# Patient Record
(demographics unavailable — no encounter records)

---

## 2024-11-01 NOTE — END OF VISIT
[] : Fellow [FreeTextEntry3] : Patient seen with Dr Dawn. Stable RA seropositive non-erosive on MTX 8 tabs. Pain in right knee following a fall a month ago. RA is inactive. Right knee mild warmth and swelling in area of fall. For plain Xray, reassurance. Can do further imaging if it doesnt resolve. Routine bloodwork [Time Spent: ___ minutes] : I have spent [unfilled] minutes of time on the encounter which excludes teaching and separately reported services.

## 2024-11-01 NOTE — PHYSICAL EXAM
[General Appearance - Alert] : alert [General Appearance - In No Acute Distress] : in no acute distress [General Appearance - Well Nourished] : well nourished [General Appearance - Well Developed] : well developed [PERRL With Normal Accommodation] : pupils were equal in size, round, and reactive to light [Sclera] : the sclera and conjunctiva were normal [Examination Of The Oral Cavity] : the lips and gums were normal [Oropharynx] : the oropharynx was normal [Respiration, Rhythm And Depth] : normal respiratory rhythm and effort [Auscultation Breath Sounds / Voice Sounds] : lungs were clear to auscultation bilaterally [Heart Rate And Rhythm] : heart rate was normal and rhythm regular [Heart Sounds] : normal S1 and S2 [Abdomen Soft] : soft [Edema] : there was no peripheral edema [Abdomen Tenderness] : non-tender [Cervical Lymph Nodes Enlarged Posterior Bilaterally] : posterior cervical [Cervical Lymph Nodes Enlarged Anterior Bilaterally] : anterior cervical [Supraclavicular Lymph Nodes Enlarged Bilaterally] : supraclavicular [Skin Lesions] : no skin lesions [] : no rash [Abnormal Walk] : normal gait [Nail Clubbing] : no clubbing  or cyanosis of the fingernails [Musculoskeletal - Swelling] : no joint swelling seen [FreeTextEntry1] : R knee medial aspect trace effusion and warmth, tenderness, ROM intact. otherwise no tender or swollen joints

## 2024-11-01 NOTE — ASSESSMENT
[FreeTextEntry1] : 42F w/ seropositive, nonerosive RA (dx 5/2017 + +CCP>250) on MTX, hypothyroidism, coming to clinic for f/u.  1) Seropositive nonerosive RA: CDAI remains 0 on MTX this visit -X-rays wrists, hands 2/2023 without erosions -x-ray c spine 1/2021, no atlantoaxial instability, mild C5-6 narrowing [] c/w MTX 8 tablets weekly (decreased from MTX 9 tabs 5/2024) -C/w Folic acid 1 mg daily [] Check labs to monitor disease activity and including inflammatory markers - CBC, CMP, ESR, CRP -Quantiferon and hepatitis negative 5/2024   2) R knee pain s/p fall [] Will obtain x-rays R knee r/o fracture (though low suspicion) given intact ROM [] PT referral given [] Advised can do voltaren gel and NSAIDs PRN  - Will re-evaluate in 4-6 weeks to see if needs additional imaging   3) +LISSETH in 2017, subserologies negative most recently check in 2022. No new symptoms at this time. -continue to monitor  4) Overweight -lifestyle modifications discussed at this visit -had been exercising regularly 30-45 minutes daily 5 days a week, currently on hold due to acute knee pain   4) Vitamin D deficiency -vitamin D 30 8/24, c/w supplementation   5) HCM -Flu shot -refused -Prevnar 6/2017, Pneumovax 1/2018. PCV 20 vaccination 9/8/2023 -Has copper IUD for contraception -negative quantiferon and Hep B panel 5/2024  -s/p Shingrix 1 dose in 2/2023 and 2nd dose 6/2023 -s/p COVID vaccine series and 3 boosters -need GYN f/u for cervical CA screening, last done in 2021 and negative per patient reporting -needs to follow up for mammogram [] Will check A1c and lipid panel today   RTC 4-6 weeks to re-assess R knee pain   case d/w Dr. Geoff Dawn MD Rheumatology Fellow

## 2024-11-01 NOTE — HISTORY OF PRESENT ILLNESS
[___ Month(s) Ago] : [unfilled] month(s) ago [FreeTextEntry1] : Patient reports mechanical trip and fall onto R knee a month ago. Reports pain since then. Took Tylenol initially but prefers to avoid meds. Has been able to ambulate and do her ADLs. Otherwise denies any joint swelling, erythema, redness, or AM stiffness. Adherent with MTX 8 pills/week and folic acid daily. Has IUD Denies fevers, chills, night sweats, alopecia, weight loss, eye pain, eye redness, vision changes, photosensitivity, rashes, ulcers, cough, chest pain, SOB, abdominal pain, n/v/d/c, changes in urinary freq, dysuria, hematuria, foamy urine

## 2024-12-13 NOTE — PHYSICAL EXAM
[General Appearance - Alert] : alert [General Appearance - In No Acute Distress] : in no acute distress [General Appearance - Well Nourished] : well nourished [General Appearance - Well Developed] : well developed [Sclera] : the sclera and conjunctiva were normal [PERRL With Normal Accommodation] : pupils were equal in size, round, and reactive to light [Examination Of The Oral Cavity] : the lips and gums were normal [Oropharynx] : the oropharynx was normal [Respiration, Rhythm And Depth] : normal respiratory rhythm and effort [Auscultation Breath Sounds / Voice Sounds] : lungs were clear to auscultation bilaterally [Heart Rate And Rhythm] : heart rate was normal and rhythm regular [Heart Sounds] : normal S1 and S2 [Edema] : there was no peripheral edema [Abdomen Soft] : soft [Abdomen Tenderness] : non-tender [Cervical Lymph Nodes Enlarged Posterior Bilaterally] : posterior cervical [Cervical Lymph Nodes Enlarged Anterior Bilaterally] : anterior cervical [Supraclavicular Lymph Nodes Enlarged Bilaterally] : supraclavicular [] : no rash [Skin Lesions] : no skin lesions [Abnormal Walk] : normal gait [Nail Clubbing] : no clubbing  or cyanosis of the fingernails [Musculoskeletal - Swelling] : no joint swelling seen [FreeTextEntry1] : no synovitis, R knee no tenderness, full ROM

## 2024-12-13 NOTE — HISTORY OF PRESENT ILLNESS
[___ Month(s) Ago] : [unfilled] month(s) ago [FreeTextEntry1] : Following up for R knee pain s/p mechanical trip and fall 2 months ago. X-rays negative. Has been able to ambulate and do her ADLs. Did PT with improved symptoms. Has been applying voltaren gel with relief. Overall 70% improved symptoms. Otherwise denies any joint swelling, erythema, redness, or AM stiffness. RA controlled. Adherent with MTX 8 pills/week and folic acid daily. Has IUD Denies fevers, chills, night sweats, alopecia, weight loss, eye pain, eye redness, vision changes, photosensitivity, rashes, ulcers, cough, chest pain, SOB, abdominal pain, n/v/d/c, changes in urinary freq, dysuria, hematuria, foamy urine

## 2024-12-13 NOTE — ASSESSMENT
[FreeTextEntry1] : 42F w/ seropositive, nonerosive RA (dx 5/2017 + +CCP>250) on MTX, hypothyroidism, coming to clinic for f/u.  1) Seropositive nonerosive RA: CDAI remains 0 on MTX this visit -X-rays wrists, hands 2/2023 without erosions; will repeat at next visit 2/2025  -x-ray c spine 1/2021, no atlantoaxial instability, mild C5-6 narrowing [] c/w MTX 8 tablets weekly (decreased from MTX 9 tabs 5/2024) -C/w Folic acid 1 mg daily [] 11/2024 labs for disease activity and monitoring stable; will repeat again in 2 months  -Quantiferon and hepatitis negative 5/2024   2) R knee pain s/p fall 2 months ago, now much improved  - X-rays negative  -S/p PT with improvement, now doing exercises at home  -Prefers to avoids oral meds; using voltaren gel with relief; can c/w voltaren gel   3) +LISSETH in 2017, subserologies negative most recently check in 2022. No new symptoms at this time. -continue to monitor  4) Overweight -lifestyle modifications discussed at this visit -had been exercising regularly 30-45 minutes daily 5 days a week   5) Vitamin D deficiency -vitamin D 30 8/24, c/w supplementation   6) HCM -Flu shot -refused -Prevnar 6/2017, Pneumovax 1/2018. PCV 20 vaccination 9/8/2023 -Has copper IUD for contraception -negative quantiferon and Hep B panel 5/2024  -s/p Shingrix 1 dose in 2/2023 and 2nd dose 6/2023 -s/p COVID vaccine series and 3 boosters -need GYN f/u for cervical CA screening, last done in 2021 and negative per patient reporting -needs to follow up for mammogram -A1c 5.7 11/24 c/w pre-DM, elevated lipids but not done fasting, will repeat at next visit   RTO 2 months   Case d/w Dr. Faith Dawn MD Rheumatology Fellow

## 2025-02-07 NOTE — HISTORY OF PRESENT ILLNESS
[___ Month(s) Ago] : [unfilled] month(s) ago [FreeTextEntry1] : Reports feeling well. R knee pain after fall now 90% improved. Denies other joint pain, joint swelling, erythema, redness, or AM stiffness. RA controlled. Adherent with MTX 8 pills/week and folic acid daily. Has IUD Denies fevers, chills, night sweats, alopecia, weight loss, eye pain, eye redness, vision changes, photosensitivity, rashes, ulcers, cough, chest pain, SOB, abdominal pain, n/v/d/c, changes in urinary freq, dysuria, hematuria, foamy urine

## 2025-02-07 NOTE — END OF VISIT
[] : Fellow [FreeTextEntry3] : Patient seen with Dr Dawn. Stable RA for routine blood work, continue medications [Time Spent: ___ minutes] : I have spent [unfilled] minutes of time on the encounter which excludes teaching and separately reported services.

## 2025-02-07 NOTE — ASSESSMENT
[FreeTextEntry1] : 43F w/ seropositive, nonerosive RA (dx 5/2017 + +CCP>250) on MTX, hypothyroidism, coming to clinic for f/u.  1) Seropositive nonerosive RA: CDAI remains 0 on MTX this visit -x-ray c spine 1/2021, no atlantoaxial instability, mild C5-6 narrowing [] c/w MTX 8 tablets weekly (decreased from MTX 9 tabs 5/2024) -C/w Folic acid 1 mg daily [] will obtain disease monitoring labs (CBC, CMP, ESR, and CRP) today  [] X-rays wrists, hands 2/2023 without erosions; will repeat today  -Quantiferon and hepatitis negative 5/2024   2) R knee pain s/p fall 2 months ago, now much improved  -X-rays negative  -S/p PT with improvement, now doing exercises at home  -Prefers to avoids oral meds; using voltaren gel PRN with relief   3) +LISSETH in 2017, subserologies negative most recently check in 2022. No new symptoms at this time. -continue to monitor  4) Overweight -lifestyle modifications discussed at this visit -reports returning to exercising 30-45 minutes daily 3 days a week   5) HCM -Flu shot -refused -Prevnar 6/2017, Pneumovax 1/2018. PCV 20 vaccination 9/8/2023 -Has copper IUD for contraception -negative quantiferon and Hep B panel 5/2024  -s/p Shingrix 1 dose in 2/2023 and 2nd dose 6/2023 -s/p COVID vaccine series and 3 boosters -need GYN f/u for cervical CA screening, last done in 2021 and negative per patient reporting -needs to follow up for mammogram [] A1c 5.7 11/24 c/w pre-DM, elevated lipids but not done fasting, will repeat today   RTO 3 months   Case d/w Dr. Geoff Dawn MD Rheumatology Fellow

## 2025-02-07 NOTE — PHYSICAL EXAM
[General Appearance - Alert] : alert [General Appearance - In No Acute Distress] : in no acute distress [General Appearance - Well Nourished] : well nourished [General Appearance - Well Developed] : well developed [Sclera] : the sclera and conjunctiva were normal [PERRL With Normal Accommodation] : pupils were equal in size, round, and reactive to light [Examination Of The Oral Cavity] : the lips and gums were normal [Oropharynx] : the oropharynx was normal [Respiration, Rhythm And Depth] : normal respiratory rhythm and effort [Auscultation Breath Sounds / Voice Sounds] : lungs were clear to auscultation bilaterally [Heart Rate And Rhythm] : heart rate was normal and rhythm regular [Heart Sounds] : normal S1 and S2 [Edema] : there was no peripheral edema [Abdomen Soft] : soft [Abdomen Tenderness] : non-tender [Cervical Lymph Nodes Enlarged Posterior Bilaterally] : posterior cervical [Cervical Lymph Nodes Enlarged Anterior Bilaterally] : anterior cervical [Supraclavicular Lymph Nodes Enlarged Bilaterally] : supraclavicular [] : no rash [Skin Lesions] : no skin lesions [Abnormal Walk] : normal gait [Nail Clubbing] : no clubbing  or cyanosis of the fingernails [Musculoskeletal - Swelling] : no joint swelling seen [FreeTextEntry1] : no synovitis, ROM of joints intact

## 2025-04-09 NOTE — HEALTH RISK ASSESSMENT
[1 or 2 (0 pts)] : 1 or 2 (0 points) [Never (0 pts)] : Never (0 points) [No] : In the past 12 months have you used drugs other than those required for medical reasons? No [0] : 2) Feeling down, depressed, or hopeless: Not at all (0) [PHQ-2 Negative - No further assessment needed] : PHQ-2 Negative - No further assessment needed [Never] : Never [Audit-CScore] : 0 [[id number]] : 0

## 2025-04-09 NOTE — HISTORY OF PRESENT ILLNESS
[FreeTextEntry1] : weight gain 20 lbs [de-identified] : 43F hx seropositive nonerosive RA (on MTX/folate), hypothyroidism, dyslipidemia.  # Secondary amenorrhea - 12 months without menstruation. - Irregular periods preceding this period but regular before that - denies hot flashes but has weight gain - has copper IUD (unlikely to be pregnant) - not currently under mental or physical duress - has not been able to see Gyn - denies headaches or visual changes  # Rheumatoid Arthritis - symptoms stable on methotrexate/folate (current dose 20mg qWeekly), follows closely with rheum (last appt 2/2025) - negative quantiferon and Hep B panel 5/2024   # Hypothyroidism - asymptomatic on levothyroxine 125mcg qD - last TFTs: TSH 1.66 4/2023  # Overweight # HLD # Prediabetes - 20 lbs increase developed over past couple years - recently injured knee (6+ months ago; completed PT) and decreased exercise and diet worsened so thinks she put on weight due to that - prefers holding off on statin initiation at this time - LDL noted to be 148 - A1c 5.7 11/2024 - diet: has decreased proportions of carbohydrates and fats  # HCM - Has copper IUD for contraception - pap/hpv 4/2021 neg; rpt due 2026 - colon cancer screening FOBT neg 2021 - HCV not tested, HIV neg 2021 - Vax: shingles 2023 (per rheum recs), Covid x5 up to 2021, prevnar 20 2023, Tdap 4/2015 --> due for rpt now

## 2025-04-09 NOTE — ASSESSMENT
[FreeTextEntry1] : 43F hx seropositive nonerosive RA (on MTX/folate), hypothyroidism, dyslipidemia.  #HCM - RTC in 6-12 months for annual check up - Has copper IUD for contraception - mammo ordered - pap/hpv 4/2021 neg; rpt due 2026 - colon cancer screening FOBT neg 2021 - HCV not tested, HIV neg 2021 - Vax: shingles 2023 (per rheum recs), Covid x5 up to 2021, prevnar 20 2023, Tdap 4/2015 --> due today (administered) - Routine labs ordered: none today   Case discussed with attending physician, Dr. Gonzalez.   BUD MOELLER MD, PhD - PGY-3 04/09/2025 [Vaccines Reviewed] : Immunizations reviewed today. Please see immunization details in the vaccine log within the immunization flowsheet.

## 2025-04-09 NOTE — INTERPRETER SERVICES
[Patient Declined  Services] : - None: Patient declined  services [FreeTextEntry3] : speaks fluent english

## 2025-04-09 NOTE — HEALTH RISK ASSESSMENT
[1 or 2 (0 pts)] : 1 or 2 (0 points) [Never (0 pts)] : Never (0 points) [No] : In the past 12 months have you used drugs other than those required for medical reasons? No [0] : 2) Feeling down, depressed, or hopeless: Not at all (0) [PHQ-2 Negative - No further assessment needed] : PHQ-2 Negative - No further assessment needed [Never] : Never [Audit-CScore] : 0 [[id number]] : 0

## 2025-04-09 NOTE — PHYSICAL EXAM
[No Acute Distress] : no acute distress [Well Developed] : well developed [No Respiratory Distress] : no respiratory distress  [Clear to Auscultation] : lungs were clear to auscultation bilaterally [Normal Rate] : normal rate  [Normal S1, S2] : normal S1 and S2 [No Varicosities] : no varicosities [No Edema] : there was no peripheral edema [Soft] : abdomen soft [Non Tender] : non-tender

## 2025-04-09 NOTE — HISTORY OF PRESENT ILLNESS
[FreeTextEntry1] : weight gain 20 lbs [de-identified] : 43F hx seropositive nonerosive RA (on MTX/folate), hypothyroidism, dyslipidemia.  # Secondary amenorrhea - 12 months without menstruation. - Irregular periods preceding this period but regular before that - denies hot flashes but has weight gain - has copper IUD (unlikely to be pregnant) - not currently under mental or physical duress - has not been able to see Gyn - denies headaches or visual changes  # Rheumatoid Arthritis - symptoms stable on methotrexate/folate (current dose 20mg qWeekly), follows closely with rheum (last appt 2/2025) - negative quantiferon and Hep B panel 5/2024   # Hypothyroidism - asymptomatic on levothyroxine 125mcg qD - last TFTs: TSH 1.66 4/2023  # Overweight # HLD # Prediabetes - 20 lbs increase developed over past couple years - recently injured knee (6+ months ago; completed PT) and decreased exercise and diet worsened so thinks she put on weight due to that - prefers holding off on statin initiation at this time - LDL noted to be 148 - A1c 5.7 11/2024 - diet: has decreased proportions of carbohydrates and fats  # HCM - Has copper IUD for contraception - pap/hpv 4/2021 neg; rpt due 2026 - colon cancer screening FOBT neg 2021 - HCV not tested, HIV neg 2021 - Vax: shingles 2023 (per rheum recs), Covid x5 up to 2021, prevnar 20 2023, Tdap 4/2015 --> due for rpt now

## 2025-05-06 NOTE — COUNSELING
[Nutrition/ Exercise/ Weight Management] : nutrition, exercise, weight management [Vitamins/Supplements] : vitamins/supplements [Bladder Hygiene] : bladder hygiene [Medication Management] : medication management Simple / Intermediate / Complex Repair - Final Wound Length In Cm: 0

## 2025-05-06 NOTE — HISTORY OF PRESENT ILLNESS
[FreeTextEntry1] : 42yo  (LMP ) with h/o RA/hashimotos/anemia/ HLD/ PreDm/hypothyroidis, with recent diagnosis of hypothalamic hypogonadism (work up done for amennorhea x 1 yr with previous monthly menses).  Denies hot flashes/ night sweats/ mood irritability +vag dryness.  Labs:  25: estradiol 9, FHS  2.4, LH <0.3 , Prolactin 96.9*, TSH 5.39* Brain MRI 25: pituitary micoadenoma  Pt started on Cabergoline 0.5 1/2tab twice weekly and Levothyroxine 125mcg daily - recently.  No return of meses yet. Pt denies pain/ change in bowel or bladder habits.  Monogamous- desires STD screen Paragard in place from 2018 (note in chart) Pt reports occasional vaginal itching- denies d/c , odor or burning   Obhx: 2 xNSVD  2 x SAB (D&C) Gynhx: denies abnl paps/ stds/ cysts/ fibroids    PAP-   Mammo- Never  Pmhx: as abover Shx: breast augmentation, abdominoplasty, D&C MEds:  Cabergoline, Methotrexate, levothyroxine, folic acid ALL: NKDA Sochx: denies tob/etoh/drugs. denies Dv or abuse issues Famhx: sister- leukemia.     Mom- DM/HTN/HLD  Gen health followed by med- awaiting endo appt PHQ9 NEG

## 2025-05-06 NOTE — PHYSICAL EXAM
[Appropriately responsive] : appropriately responsive [Alert] : alert [No Acute Distress] : no acute distress [No Lymphadenopathy] : no lymphadenopathy [Soft] : soft [Non-tender] : non-tender [Non-distended] : non-distended [No HSM] : No HSM [No Lesions] : no lesions [No Mass] : no mass [Oriented x3] : oriented x3 [Examination Of The Breasts] : a normal appearance [No Masses] : no breast masses were palpable [Labia Majora] : normal [Labia Minora] : normal [Normal] : normal [Normal Position] : in a normal position [Tenderness] : nontender [Enlarged ___ wks] : not enlarged [Uterine Adnexae] : normal

## 2025-05-30 NOTE — PHYSICAL EXAM
[General Appearance - Alert] : alert [General Appearance - In No Acute Distress] : in no acute distress [General Appearance - Well Nourished] : well nourished [General Appearance - Well Developed] : well developed [Sclera] : the sclera and conjunctiva were normal [PERRL With Normal Accommodation] : pupils were equal in size, round, and reactive to light [Examination Of The Oral Cavity] : the lips and gums were normal [Oropharynx] : the oropharynx was normal [Respiration, Rhythm And Depth] : normal respiratory rhythm and effort [Auscultation Breath Sounds / Voice Sounds] : lungs were clear to auscultation bilaterally [Heart Rate And Rhythm] : heart rate was normal and rhythm regular [Heart Sounds] : normal S1 and S2 [Edema] : there was no peripheral edema [Abdomen Soft] : soft [Abdomen Tenderness] : non-tender [] : no rash [Skin Lesions] : no skin lesions [Abnormal Walk] : normal gait [Nail Clubbing] : no clubbing  or cyanosis of the fingernails [Musculoskeletal - Swelling] : no joint swelling seen

## 2025-06-03 NOTE — HISTORY OF PRESENT ILLNESS
[FreeTextEntry1] : RPA [de-identified] : 43F hx seropositive nonerosive RA (on MTX/folate), hypothyroidism, dyslipidemia.  #Pituitary adenoma  - On Cabergoline, tolerating relatively well with some stomach upset. Currently taking with food  - Advised by endocrinology e-consult to obtain ophthalmology evaluation and schedule in person appt. Appt for endocrinology scheduled for 7/17   # Secondary amenorrhea - Previously saw GYN, was advised to complete TVUS - not yet done  - Was advised by GYN to wait several months to see if periods return i/s/o relatively recent adjustment to Synthroid   # Hypothyroidism - asymptomatic on levothyroxine 125mcg qD - last TFTs: TSH 1.66 4/2023

## 2025-06-03 NOTE — HISTORY OF PRESENT ILLNESS
[FreeTextEntry1] : RPA [de-identified] : 43F hx seropositive nonerosive RA (on MTX/folate), hypothyroidism, dyslipidemia.  #Pituitary adenoma  - On Cabergoline, tolerating relatively well with some stomach upset. Currently taking with food  - Advised by endocrinology e-consult to obtain ophthalmology evaluation and schedule in person appt. Appt for endocrinology scheduled for 7/17   # Secondary amenorrhea - Previously saw GYN, was advised to complete TVUS - not yet done  - Was advised by GYN to wait several months to see if periods return i/s/o relatively recent adjustment to Synthroid   # Hypothyroidism - asymptomatic on levothyroxine 125mcg qD - last TFTs: TSH 1.66 4/2023

## 2025-06-03 NOTE — ASSESSMENT
[FreeTextEntry1] : #HCM  - F/u in 6 months for RPA to assess amenorrhea symptoms and follow-up GYN appt   Patient discussed with attending Dr. Flores

## 2025-07-17 NOTE — PHYSICAL EXAM
[Alert] : alert [No Acute Distress] : no acute distress [Well Developed] : well developed [EOMI] : extra ocular movement intact [PERRL] : pupils equal, round and reactive to light [No Lid Lag] : no lid lag [No Neck Mass] : no neck mass was observed [Supple] : the neck was supple [Thyroid Not Enlarged] : the thyroid was not enlarged [No Thyroid Nodules] : no palpable thyroid nodules [No Respiratory Distress] : no respiratory distress [Normal Rate and Effort] : normal respiratory rate and effort [Clear to Auscultation] : lungs were clear to auscultation bilaterally [Normal S1, S2] : normal S1 and S2 [No Murmurs] : no murmurs [Normal Rate] : heart rate was normal [Regular Rhythm] : with a regular rhythm [No Edema] : no peripheral edema [Normal Bowel Sounds] : normal bowel sounds [Not Tender] : non-tender [Not Distended] : not distended [Soft] : abdomen soft [Normal Gait] : normal gait [No Joint Swelling] : no joint swelling seen [Normal Strength/Tone] : muscle strength and tone were normal [No Rash] : no rash [Normal Reflexes] : deep tendon reflexes were 2+ and symmetric [Oriented x3] : oriented to person, place, and time [Normal Affect] : the affect was normal [Normal Insight/Judgement] : insight and judgment were intact [Normal Mood] : the mood was normal

## 2025-07-17 NOTE — ASSESSMENT
[Levothyroxine] : The patient was instructed to take Levothyroxine on an empty stomach, separate from vitamins, and wait at least 30 minutes before eating [FreeTextEntry1] : Patient is a 43-year-old female with history of hypothyroidism, HLD, RA (on MTX/folate), and recent diagnosis of prolactinoma who presents for initial eval.   #pituitary microadenoma #prolactinoma #hypogonadism #amenorrhea  Patient diagnosed w prolactinoma while OBGYN workup for secondary amenorrhea.  4/2025 prolactin 96.9, LH 0.3, FSH 2.4 4/2025 MRI sella: 5.7 X 4.6 mm of R pituitary  End of 4/2025: started on cabergoline 0.5 1/2 tab twice weekly (Tuesday, Friday). Patient tolerating medication without mood/personality changes, however w mild GI upset. Patient has had return of menses  Plan - continue cabergoline 0.5 1/2 tab twice weekly - repeat prolactin level  - to complete functional status workup, will collect ACTH, AM cortisol, IGF-1 - patient to see optho 8/1 to assess visual fields  Follow up in 3 mos to repeat labs  #hypothyroidism On levothyroxine 125 mcg 6/2025 TSH 9.84, fT4 1.1- Patient admits to taking medication after breakfast Patient counseled on taking levothyroxine 1 hr prior to meals; patient currently not on ppi, iron supplement, or other medication that would affect absorption Plan - repeat TFTs - continue levothyroxine 125 mcg for now, pending lab results   #vitamin D def vitamin D (1, 25OH)- 5/2023  27.5 Patient has been taking vit D 1000 UT capsule daily Plan - check vitD 25 0H - continue vitD supplementation  #HLD #overweight c/f preDM  Patient reports she has managed weight with lifestyle modification in which she walks 30 min per day.  lipid- 4/2023 , , HDL 44,  BMI 27.46 Plan - check a1c  - continue with lifestyle mod - f/up with PCP for repeat lipid panel   Plan discussed with Dr. Royal Patient to RTC in 3 mos

## 2025-07-17 NOTE — HISTORY OF PRESENT ILLNESS
[FreeTextEntry1] : Patient is a 43-year-old female with history of hypothyroidism, HLD, RA (on MTX/folate), and recent diagnosis of prolactinoma who presents for initial eval.   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 4/2025 prolactin 96.9, LH 0.3, FSH 2.4 4/2025 MRI sella: 5.7 X 4.6 mm of R pituitary  End of 4/2025: started on cabergoline 0.5 1/2 tab twice weekly (Tuesday, Friday) At time of diagnosis and currently, denies galactorrhea, HA, or vision changes. Since starting medication, patient denies mood instability, however does report mild abdominal discomfort since starting medication. She has had return of menses (May 25, June 25 ([facility])). Patient has optho appointment 8/1 to assess visual fields   #Hypothyroidism patient reports she was diagnosed "years ago", unable to illicit date. Patient denies recent dose change (on levothyroxine 125 mcg daily). Of note, iso 6/2025 TSH 9.84, fT4 1.1- patient admits to taking levothyroxine dose after breakfast, in which she was counseled to take levothyroxine on empty stomach. Patient reports since June, she has taken levothyroxine 1 hr prior to PO intake and without other medications.  She denies weakness, fatigue, myalgias, depression, cold intolerance, weight gain, constipation,  or changes in skin or hair texture.  #overweight and HLD Patient reports she has managed weight with lifestyle modification in which she walks 30 min per day.  lipid- 4/2023 , , HDL 44,   #vit D def  vitamin D (1, 25OH)- 5/2023  27.5 Patient has been taking vit D 1000 UT capsule daily